# Patient Record
Sex: FEMALE | Race: WHITE | Employment: FULL TIME | URBAN - METROPOLITAN AREA
[De-identification: names, ages, dates, MRNs, and addresses within clinical notes are randomized per-mention and may not be internally consistent; named-entity substitution may affect disease eponyms.]

---

## 2021-02-24 ENCOUNTER — OFFICE VISIT (OUTPATIENT)
Dept: INTERNAL MEDICINE CLINIC | Age: 26
End: 2021-02-24

## 2021-02-24 VITALS
TEMPERATURE: 98.1 F | SYSTOLIC BLOOD PRESSURE: 101 MMHG | BODY MASS INDEX: 24.32 KG/M2 | OXYGEN SATURATION: 98 % | HEIGHT: 60 IN | HEART RATE: 70 BPM | WEIGHT: 123.9 LBS | RESPIRATION RATE: 18 BRPM | DIASTOLIC BLOOD PRESSURE: 65 MMHG

## 2021-02-24 DIAGNOSIS — F41.9 ANXIETY AND DEPRESSION: ICD-10-CM

## 2021-02-24 DIAGNOSIS — E01.0 THYROMEGALY: ICD-10-CM

## 2021-02-24 DIAGNOSIS — Z79.899 PRESCRIPTION MEDICATION STARTED: ICD-10-CM

## 2021-02-24 DIAGNOSIS — Z00.00 WELLNESS EXAMINATION: Primary | ICD-10-CM

## 2021-02-24 DIAGNOSIS — R13.10 DYSPHAGIA, UNSPECIFIED TYPE: ICD-10-CM

## 2021-02-24 DIAGNOSIS — H81.10 BENIGN PAROXYSMAL POSITIONAL VERTIGO, UNSPECIFIED LATERALITY: ICD-10-CM

## 2021-02-24 DIAGNOSIS — T88.7XXA MEDICATION SIDE EFFECT: ICD-10-CM

## 2021-02-24 DIAGNOSIS — F32.A ANXIETY AND DEPRESSION: ICD-10-CM

## 2021-02-24 DIAGNOSIS — Z76.89 ESTABLISHING CARE WITH NEW DOCTOR, ENCOUNTER FOR: ICD-10-CM

## 2021-02-24 LAB
ALBUMIN SERPL-MCNC: 3.9 G/DL (ref 3.5–5)
ALBUMIN/GLOB SERPL: 1.2 {RATIO} (ref 1.1–2.2)
ALP SERPL-CCNC: 84 U/L (ref 45–117)
ALT SERPL-CCNC: 15 U/L (ref 12–78)
ANION GAP SERPL CALC-SCNC: 6 MMOL/L (ref 5–15)
APPEARANCE UR: CLEAR
AST SERPL-CCNC: 15 U/L (ref 15–37)
BASOPHILS # BLD: 0.1 K/UL (ref 0–0.1)
BASOPHILS NFR BLD: 1 % (ref 0–1)
BILIRUB SERPL-MCNC: 0.5 MG/DL (ref 0.2–1)
BILIRUB UR QL: NEGATIVE
BUN SERPL-MCNC: 9 MG/DL (ref 6–20)
BUN/CREAT SERPL: 15 (ref 12–20)
CALCIUM SERPL-MCNC: 9.3 MG/DL (ref 8.5–10.1)
CHLORIDE SERPL-SCNC: 107 MMOL/L (ref 97–108)
CHOLEST SERPL-MCNC: 159 MG/DL
CO2 SERPL-SCNC: 27 MMOL/L (ref 21–32)
COLOR UR: ABNORMAL
CREAT SERPL-MCNC: 0.61 MG/DL (ref 0.55–1.02)
DIFFERENTIAL METHOD BLD: NORMAL
EOSINOPHIL # BLD: 0.2 K/UL (ref 0–0.4)
EOSINOPHIL NFR BLD: 4 % (ref 0–7)
ERYTHROCYTE [DISTWIDTH] IN BLOOD BY AUTOMATED COUNT: 12.3 % (ref 11.5–14.5)
EST. AVERAGE GLUCOSE BLD GHB EST-MCNC: 114 MG/DL
GLOBULIN SER CALC-MCNC: 3.2 G/DL (ref 2–4)
GLUCOSE SERPL-MCNC: 90 MG/DL (ref 65–100)
GLUCOSE UR STRIP.AUTO-MCNC: NEGATIVE MG/DL
HBA1C MFR BLD: 5.6 % (ref 4–5.6)
HCG SERPL QL: NEGATIVE
HCT VFR BLD AUTO: 37.1 % (ref 35–47)
HDLC SERPL-MCNC: 75 MG/DL
HDLC SERPL: 2.1 {RATIO} (ref 0–5)
HGB BLD-MCNC: 11.7 G/DL (ref 11.5–16)
HGB UR QL STRIP: NEGATIVE
IMM GRANULOCYTES # BLD AUTO: 0 K/UL (ref 0–0.04)
IMM GRANULOCYTES NFR BLD AUTO: 0 % (ref 0–0.5)
KETONES UR QL STRIP.AUTO: ABNORMAL MG/DL
LDLC SERPL CALC-MCNC: 73.8 MG/DL (ref 0–100)
LEUKOCYTE ESTERASE UR QL STRIP.AUTO: NEGATIVE
LIPID PROFILE,FLP: NORMAL
LYMPHOCYTES # BLD: 2.4 K/UL (ref 0.8–3.5)
LYMPHOCYTES NFR BLD: 42 % (ref 12–49)
MCH RBC QN AUTO: 29.7 PG (ref 26–34)
MCHC RBC AUTO-ENTMCNC: 31.5 G/DL (ref 30–36.5)
MCV RBC AUTO: 94.2 FL (ref 80–99)
MONOCYTES # BLD: 0.4 K/UL (ref 0–1)
MONOCYTES NFR BLD: 7 % (ref 5–13)
NEUTS SEG # BLD: 2.6 K/UL (ref 1.8–8)
NEUTS SEG NFR BLD: 46 % (ref 32–75)
NITRITE UR QL STRIP.AUTO: NEGATIVE
NRBC # BLD: 0 K/UL (ref 0–0.01)
NRBC BLD-RTO: 0 PER 100 WBC
PH UR STRIP: 5 [PH] (ref 5–8)
PLATELET # BLD AUTO: 287 K/UL (ref 150–400)
PMV BLD AUTO: 10.4 FL (ref 8.9–12.9)
POTASSIUM SERPL-SCNC: 4.3 MMOL/L (ref 3.5–5.1)
PROT SERPL-MCNC: 7.1 G/DL (ref 6.4–8.2)
PROT UR STRIP-MCNC: NEGATIVE MG/DL
RBC # BLD AUTO: 3.94 M/UL (ref 3.8–5.2)
SODIUM SERPL-SCNC: 140 MMOL/L (ref 136–145)
SP GR UR REFRACTOMETRY: 1.02 (ref 1–1.03)
TRIGL SERPL-MCNC: 51 MG/DL (ref ?–150)
TSH SERPL DL<=0.05 MIU/L-ACNC: 1.76 UIU/ML (ref 0.36–3.74)
UROBILINOGEN UR QL STRIP.AUTO: 0.2 EU/DL (ref 0.2–1)
VLDLC SERPL CALC-MCNC: 10.2 MG/DL
WBC # BLD AUTO: 5.6 K/UL (ref 3.6–11)

## 2021-02-24 PROCEDURE — 99213 OFFICE O/P EST LOW 20 MIN: CPT | Performed by: FAMILY MEDICINE

## 2021-02-24 PROCEDURE — 99385 PREV VISIT NEW AGE 18-39: CPT | Performed by: FAMILY MEDICINE

## 2021-02-24 RX ORDER — DESVENLAFAXINE SUCCINATE 50 MG/1
50 TABLET, EXTENDED RELEASE ORAL DAILY
COMMUNITY
Start: 2020-12-17 | End: 2021-03-16 | Stop reason: SDUPTHER

## 2021-02-24 RX ORDER — HYDROXYZINE HYDROCHLORIDE 10 MG/1
10 TABLET, FILM COATED ORAL
Qty: 30 TAB | Refills: 0 | Status: SHIPPED | OUTPATIENT
Start: 2021-02-24 | End: 2021-03-06

## 2021-02-24 NOTE — PROGRESS NOTES
1. Have you been to the ER, urgent care clinic since your last visit? Hospitalized since your last visit? No    2. Have you seen or consulted any other health care providers outside of the [address] [place] [address] since your last visit? Include any pap smears or colon screening.  No

## 2021-02-24 NOTE — PATIENT INSTRUCTIONS
Well Visit, Ages 25 to 48: Care Instructions  Your Care Instructions     Physical exams can help you stay healthy. Your doctor has checked your overall health and may have suggested ways to take good care of yourself. He or she also may have recommended tests. At home, you can help prevent illness with healthy eating, regular exercise, and other steps. Follow-up care is a key part of your treatment and safety. Be sure to make and go to all appointments, and call your doctor if you are having problems. It's also a good idea to know your test results and keep a list of the medicines you take. How can you care for yourself at home? · Reach and stay at a healthy weight. This will lower your risk for many problems, such as obesity, diabetes, heart disease, and high blood pressure. · Get at least 30 minutes of physical activity on most days of the week. Walking is a good choice. You also may want to do other activities, such as running, swimming, cycling, or playing tennis or team sports. Discuss any changes in your exercise program with your doctor. · Do not smoke or allow others to smoke around you. If you need help quitting, talk to your doctor about stop-smoking programs and medicines. These can increase your chances of quitting for good. · Talk to your doctor about whether you have any risk factors for sexually transmitted infections (STIs). Having one sex partner (who does not have STIs and does not have sex with anyone else) is a good way to avoid these infections. · Use birth control if you do not want to have children at this time. Talk with your doctor about the choices available and what might be best for you. · Protect your skin from too much sun. When you're outdoors from 10 a.m. to 4 p.m., stay in the shade or cover up with clothing and a hat with a wide brim. Wear sunglasses that block UV rays. Even when it's cloudy, put broad-spectrum sunscreen (SPF 30 or higher) on any exposed skin.   · See a dentist one or two times a year for checkups and to have your teeth cleaned. · Wear a seat belt in the car. Follow your doctor's advice about when to have certain tests. These tests can spot problems early. For everyone  · Cholesterol. Have the fat (cholesterol) in your blood tested after age 21. Your doctor will tell you how often to have this done based on your age, family history, or other things that can increase your risk for heart disease. · Blood pressure. Have your blood pressure checked during a routine doctor visit. Your doctor will tell you how often to check your blood pressure based on your age, your blood pressure results, and other factors. · Vision. Talk with your doctor about how often to have a glaucoma test.  · Diabetes. Ask your doctor whether you should have tests for diabetes. · Colon cancer. Your risk for colorectal cancer gets higher as you get older. Some experts say that adults should start regular screening at age 48 and stop at age 76. Others say to start before age 48 or continue after age 76. Talk with your doctor about your risk and when to start and stop screening. For women  · Breast exam and mammogram. Talk to your doctor about when you should have a clinical breast exam and a mammogram. Medical experts differ on whether and how often women under 50 should have these tests. Your doctor can help you decide what is right for you. · Cervical cancer screening test and pelvic exam. Begin with a Pap test at age 24. The test often is part of a pelvic exam. Starting at age 27, you may choose to have a Pap test, an HPV test, or both tests at the same time (called co-testing). Talk with your doctor about how often to have testing. · Tests for sexually transmitted infections (STIs). Ask whether you should have tests for STIs. You may be at risk if you have sex with more than one person, especially if your partners do not wear condoms.   For men  · Tests for sexually transmitted infections (STIs). Ask whether you should have tests for STIs. You may be at risk if you have sex with more than one person, especially if you do not wear a condom. · Testicular cancer exam. Ask your doctor whether you should check your testicles regularly. · Prostate exam. Talk to your doctor about whether you should have a blood test (called a PSA test) for prostate cancer. Experts differ on whether and when men should have this test. Some experts suggest it if you are older than 39 and are -American or have a father or brother who got prostate cancer when he was younger than 72. When should you call for help? Watch closely for changes in your health, and be sure to contact your doctor if you have any problems or symptoms that concern you. Where can you learn more? Go to http://www.galan.com/  Enter P072 in the search box to learn more about \"Well Visit, Ages 25 to 48: Care Instructions. \"  Current as of: May 27, 2020               Content Version: 12.6  © 2006-2020 [employer]. Care instructions adapted under license by [employer] (which disclaims liability or warranty for this information). If you have questions about a medical condition or this instruction, always ask your healthcare professional. [address] any warranty or liability for your use of this information. Hydroxyzine (By mouth)   Hydroxyzine Pamoate (sin-AVFI-i-zeen FLORINA-oh-ate)  Treats anxiety, nausea, vomiting, allergies, skin rash, hives, and itching. May also be used with anesthesia for medical procedures. Brand Name(s): Vistaril   There may be other brand names for this medicine. When This Medicine Should Not Be Used: This medicine is not right for everyone. Do not use it if you had an allergic reaction to hydroxyzine, cetirizine, or levocetirizine.  Do not use it during the early part of pregnancy or if you have prolonged QT interval.  How to Use This Medicine:   Capsule, Liquid, Tablet  · Your doctor will tell you how much medicine to use. Do not use more than directed. · Oral liquid: Measure the oral liquid medicine with a marked measuring spoon, oral syringe, or medicine cup. Shake well just before use. · Tablet: Swallow whole. Do not break, crush, or chew it. · Missed dose: Take a dose as soon as you remember. If it is almost time for your next dose, wait until then and take a regular dose. Do not take extra medicine to make up for a missed dose. · Store the medicine in a closed container at room temperature, away from heat, moisture, and direct light. Drugs and Foods to Avoid:   Ask your doctor or pharmacist before using any other medicine, including over-the-counter medicines, vitamins, and herbal products. · Some medicines can affect how hydroxyzine works. Tell your doctor if you are using any of the following:  ¨ Droperidol, methadone, ondansetron, pentamidine  ¨ Antibiotic (including azithromycin, clarithromycin, erythromycin, gatifloxacin, moxifloxacin)  ¨ Medicine to treat depression (including citalopram, fluoxetine)  ¨ Medicine to treat heart rhythm problems (including amiodarone, procainamide, quinidine, sotalol)  ¨ Medicine to treat mental health problems (including chlorpromazine, clozapine, iloperidone, quetiapine, ziprasidone)  · Tell your doctor if you use anything else that makes you sleepy. Some examples are allergy medicine, narcotic pain medicine, and alcohol. Warnings While Using This Medicine:   · Tell your doctor if you are pregnant or breastfeeding, or if you have heart disease, heart failure, a slow heartbeat, skin problems, or had a recent heart attack. · This medicine may cause the following problems:  ¨ Changes in your heart rhythm  ¨ Serious skin reaction called acute generalized exanthematous pustulosis (AGEP)  · This medicine may make you drowsy.  Do not drive or do anything that could be dangerous until you know how this medicine affects you. · Call your doctor if your symptoms do not improve or if they get worse. · Keep all medicine out of the reach of children. Never share your medicine with anyone. Possible Side Effects While Using This Medicine:   Call your doctor right away if you notice any of these side effects:  · Allergic reaction: Itching or hives, swelling in your face or hands, swelling or tingling in your mouth or throat, chest tightness, trouble breathing  · Fainting, dizziness, lightheadedness  · Fast, pounding, or uneven heartbeat  · Fever, skin rash  · Severe tiredness  If you notice these less serious side effects, talk with your doctor:   · Drowsiness, sleepiness  · Dry mouth  If you notice other side effects that you think are caused by this medicine, tell your doctor. Call your doctor for medical advice about side effects. You may report side effects to FDA at 3-020-FDA-3676  © 2017 [facility] Information is for End User's use only and may not be sold, redistributed or otherwise used for commercial purposes. The above information is an  only. It is not intended as medical advice for individual conditions or treatments. Talk to your doctor, nurse or pharmacist before following any medical regimen to see if it is safe and effective for you. Learning About Swallowing Problems  What are swallowing problems? Certain health problems that affect the nervous system can cause trouble swallowing. These conditions include stroke, ALS (also known as Genoveva Gehrig's disease), Parkinson's disease, and multiple sclerosis. The muscles and nerves that help move food through the throat and esophagus may not work right. Growths, such as cancer, and other problems with your esophagus can also make it hard to swallow. The esophagus is the tube that leads from your throat to your stomach. How are swallowing problems diagnosed?   A doctor or speech therapist will examine you to check for swallowing problems. You may get swallowing tests to check how well your throat muscles work. For these tests, you swallow a special liquid that helps the doctor see your throat and esophagus on an X-ray or video screen. Other tests use a thin, flexible tube called a scope to check for problems with your esophagus. The doctor puts the scope in your mouth and down your throat to look at your esophagus. What are the symptoms? Symptoms of swallowing problems may include:  · Trouble getting food or liquids to go down on the first try. · Gagging, choking, or coughing when you swallow. · Having food or liquids come back up through your throat, mouth, or nose after you swallow. · Feeling like foods or liquids are stuck in some part of your throat or chest.  · Pain when you swallow. How are swallowing problems treated? How swallowing problems are treated depends on the cause. The main goals of treatment will be to help you eat and swallow safely and get good nutrition. This is important for your health and quality of life. You may learn exercises to train your throat muscles to work together so you're able to swallow better. Learning certain ways to put food in your mouth or to position your head while eating may also help. Your doctor or a speech therapist may recommend changes to your diet to help make it easier to swallow. You may need to avoid certain foods or liquids. You also may need to change the thickness of foods or liquids in your diet. To eat and swallow safely, follow any instructions you get from your doctor or therapist. These ideas may help:  · Sit upright when eating, drinking, and taking pills. · Take small bites of food. Chew completely and swallow before taking another bite. · Take small sips of liquids. · If eating makes you tired, eat smaller but more frequent meals. · Tip your chin down when there is food in your mouth. Where can you learn more?   Go to http://www.gray.com/  Enter [id number] in the search box to learn more about \"Learning About Swallowing Problems. \"  Current as of: June 26, 2019               Content Version: 12.6  © 6050-6332 [employer]. Care instructions adapted under license by [employer] (which disclaims liability or warranty for this information). If you have questions about a medical condition or this instruction, always ask your healthcare professional. [address] any warranty or liability for your use of this information. Upper GI Series: About This Test  What is it? An upper gastrointestinal (GI) series looks at the upper and middle sections of the gastrointestinal tract. The test uses barium contrast material, fluoroscopy, and X-ray. Fluoroscopy is a kind of X-ray. Why is this test done? An upper GI series is done to find the cause of symptoms such as vomiting, burping up food, trouble swallowing, poor weight gain, bleeding, or belly pain. It's used to find narrow spots or blockages in the upper intestinal tract. The test can also find ulcers, polyps, and pyloric stenosis. How do you prepare for the test?  You may have to eat a low-fiber diet for a few days before the test, stop eating for 12 hours before the test, or both. You may also need to take a laxative to help clean out your intestines the evening before the test and stop taking certain medicines. How is the test done? · You will need to take off your clothes and put on a hospital gown. · Take out any dentures, and take off any jewelry. · You will lie on your back on an X-ray table. · You will have an X-ray taken before you drink the barium mix. Then you'll take small swallows repeatedly during the series of X-rays that follow. · The doctor watches the barium pass through your GI tract using fluoroscopy and X-ray pictures.  The table is tilted at different positions, and you may change positions to help spread the barium. · You may be given a laxative or enema to flush the barium out of your intestines after the test to prevent constipation. How long does the test take? The test will take about 30 to 40 minutes. If you are also having a small bowel study, the test will take 2 to 6 hours. What happens after the test?  · You will probably be able to go home right away. Results of the test are usually ready in 1 to 3 days. · You can go back to your usual activities right away. You may eat and drink whatever you like, unless your doctor tells you not to. It's a good idea to drink a lot of fluids for a few days to flush out the barium. · For 1 to 3 days after the test, your stool (feces) will look white from the barium. · If the barium stays in your intestine, it can harden and cause a blockage. If you get constipated, you may need to use a laxative to pass a stool. · In some cases, you may be asked to come back after 24 hours to have more X-rays taken. When should you call for help? Watch closely for changes in your health, and be sure to contact your doctor if:  · You aren't able to have a bowel movement in 2 to 3 days after the test.  Follow-up care is a key part of your treatment and safety. Be sure to make and go to all appointments, and call your doctor if you are having problems. It's also a good idea to keep a list of the medicines you take. Ask your doctor when you can expect to have your test results. Where can you learn more? Go to http://www.gray.com/  Enter J416 in the search box to learn more about \"Upper GI Series: About This Test.\"  Current as of: December 9, 2019               Content Version: 12.6  © 0453-2854 [employer]. Care instructions adapted under license by [employer] (which disclaims liability or warranty for this information).  If you have questions about a medical condition or this instruction, always ask your healthcare professional. [address] any warranty or liability for your use of this information.

## 2021-02-27 NOTE — PROGRESS NOTES
SPORTS MEDICINE AND PRIMARY CARE  Chata Little MD  [address] [address]    Chief Complaint   Patient presents with    [facility]       SUBJECTIVE:    Brittany Richards is a 31 y.o. female new patient here to [facility]. Past medical history of benign positional vertigo, anxiety and depression managed with Pristiq x2 years and acid reflux. Patient is considering weaning Pristiq because it makes her feel \"lazy. \"  She has a wean designed by her last doctor but has not initiated it. Anxiety levels are intermittently high but patient does not endorse panic disorder. Cognitive behavioral therapy history. Patient has acid reflux, dysphagia for solids and a neck lump sensation. She has anxiety about her gastrointestinal and neck symptoms. Thyroid ultrasound done in 2019 was negative. Current Outpatient Medications   Medication Sig Dispense Refill    desvenlafaxine succinate (PRISTIQ) 50 mg ER tablet Take 50 mg by mouth daily.  hydrOXYzine HCL (ATARAX) 10 mg tablet Take 1 Tab by mouth three (3) times daily as needed for Anxiety for up to 10 days. 30 Tab 0     Past Medical History:   Diagnosis Date    Anxiety     Depression      History reviewed. No pertinent surgical history.   No Known Allergies    REVIEW OF SYSTEMS:  General: negative for - chills or fever  ENT: negative for - headaches, nasal congestion, tinnitus, hearing loss, vision changes, sore throat  Respiratory: negative for - cough, hemoptysis, shortness of breath or wheezing  Cardiovascular : negative for - chest pain, edema, palpitations or shortness of breath  Gastrointestinal: +GERD, dysphagia; negative for - abdominal pain, blood in stools, heartburn or nausea/vomiting, diarrhea, constipation  Genito-Urinary: no dysuria, trouble voiding, hematuria or erectile dysfunction  Musculoskeletal: negative for - gait disturbance, joint pain, joint stiffness , joint swelling, muscle aches  Neurological: no TIA or stroke symptoms  Hematologic: no bruises, no bleeding, no swollen glands  Integument: no lumps, mole changes, nail changes or rash  Endocrine:no malaise/lethargy or unexpected weight changes      Social History     Socioeconomic History    Marital status: SINGLE     Spouse name: Not on file    Number of children: Not on file    Years of education: Not on file    Highest education level: Not on file   Tobacco Use    Smoking status: Never Smoker    Smokeless tobacco: Never Used   Substance and Sexual Activity    Alcohol use: Yes     Frequency: Monthly or less     Drinks per session: 1 or 2     Binge frequency: Less than monthly     Comment: occasional    Drug use: Not Currently    Sexual activity: Yes     Partners: Male     Birth control/protection: Condom     Family History   Problem Relation Age of Onset    Anxiety Mother     Depression Mother     Cancer Father        OBJECTIVE:     Visit Vitals  /65   Pulse 70   Temp 98.1 °F (36.7 °C) (Oral)   Resp 18   Ht 5' (1.524 m)   Wt 123 lb 14.4 oz (56.2 kg)   LMP 02/24/2021   SpO2 98%   BMI 24.20 kg/m²     CONSTITUTIONAL: well developed, well nourished, appears age appropriate  EYES: perrla, eom intact  ENMT:moist mucous membranes, pharynx clear  NECK: supple. Thyroid is smooth and nontender. RESPIRATORY: Chest: clear bilaterally  CARDIOVASCULAR: Heart: regular rate and rhythm pulse 2+  GASTROINTESTINAL: Abdomen: soft, bowel sounds active  HEMATOLOGIC: no pathological lymph nodes palpated  MUSCULOSKELETAL: Extremities: no edema,   INTEGUMENT: No unusual rashes or suspicious skin lesions noted. Nails appear normal.  NEUROLOGIC: non-focal exam   MENTAL STATUS: alert and oriented, appropriate affect         ASSESSMENT:   1. Establishing care with new doctor, encounter for    2. Anxiety and depression    3. Dysphagia, unspecified type    4.  Prescription medication started       I have discussed the diagnosis with the patient and the intended plan as seen in the  orders. The patient understands and agrees with the plan. The patient has   received an after visit summary and questions were answered concerning  future plans  Patient labs and/or xrays were reviewed  Past records were reviewed. PLAN:  .  Orders Placed This Encounter    XR BA SWALLOW ESOPHOGRAM    CBC WITH AUTOMATED DIFF    METABOLIC PANEL, COMPREHENSIVE    HEMOGLOBIN A1C WITH EAG    LIPID PANEL    URINALYSIS W/ RFLX MICROSCOPIC    TSH 3RD GENERATION    HCG QL SERUM     Patient will continue desvenlafaxine succinate (PRISTIQ) 50 mg ER tablet     Initiate hydrOXYzine HCL (ATARAX) 10 mg tablet 3 times daily as needed #30       Follow-up and Dispositions    · Return in about 4 weeks (around 3/24/2021) for virtual visit. Counseled regarding diet, exercise and healthy lifestyle          Advised patient to  call back or return to office if symptoms worsen/change/persist.  Discussed expected course/resolution/complications of diagnosis in detail with patient. Medication risks/benefits/costs/interactions/alternatives discussed with patient    Jazmine Hicks M.D. This note was created using voice recognition software.   Edits have been made but syntax errors might exist.

## 2021-09-14 RX ORDER — DESVENLAFAXINE SUCCINATE 50 MG/1
50 TABLET, EXTENDED RELEASE ORAL DAILY
Qty: 30 TABLET | Refills: 5 | Status: SHIPPED | OUTPATIENT
Start: 2021-09-14 | End: 2021-09-15 | Stop reason: SDUPTHER

## 2021-09-15 RX ORDER — DESVENLAFAXINE SUCCINATE 50 MG/1
50 TABLET, EXTENDED RELEASE ORAL DAILY
Qty: 30 TABLET | Refills: 5 | Status: SHIPPED | OUTPATIENT
Start: 2021-09-15 | End: 2022-09-14 | Stop reason: SDUPTHER

## 2024-03-15 PROBLEM — F41.1 GAD (GENERALIZED ANXIETY DISORDER): Status: ACTIVE | Noted: 2019-09-26

## 2024-03-21 ENCOUNTER — OFFICE VISIT (OUTPATIENT)
Facility: CLINIC | Age: 29
End: 2024-03-21

## 2024-03-21 VITALS
RESPIRATION RATE: 16 BRPM | TEMPERATURE: 97.6 F | BODY MASS INDEX: 24.82 KG/M2 | DIASTOLIC BLOOD PRESSURE: 60 MMHG | OXYGEN SATURATION: 96 % | WEIGHT: 123.13 LBS | HEIGHT: 59 IN | SYSTOLIC BLOOD PRESSURE: 92 MMHG | HEART RATE: 72 BPM

## 2024-03-21 DIAGNOSIS — J45.20 MILD INTERMITTENT ASTHMA WITHOUT COMPLICATION: ICD-10-CM

## 2024-03-21 DIAGNOSIS — Z00.00 WELL WOMAN EXAM (NO GYNECOLOGICAL EXAM): Primary | ICD-10-CM

## 2024-03-21 DIAGNOSIS — F41.1 GAD (GENERALIZED ANXIETY DISORDER): ICD-10-CM

## 2024-03-21 RX ORDER — FLUTICASONE PROPIONATE 110 UG/1
2 AEROSOL, METERED RESPIRATORY (INHALATION) 2 TIMES DAILY
Qty: 12 G | Refills: 2 | Status: SHIPPED | OUTPATIENT
Start: 2024-03-21

## 2024-03-21 SDOH — ECONOMIC STABILITY: FOOD INSECURITY: WITHIN THE PAST 12 MONTHS, THE FOOD YOU BOUGHT JUST DIDN'T LAST AND YOU DIDN'T HAVE MONEY TO GET MORE.: NEVER TRUE

## 2024-03-21 SDOH — ECONOMIC STABILITY: HOUSING INSECURITY
IN THE LAST 12 MONTHS, WAS THERE A TIME WHEN YOU DID NOT HAVE A STEADY PLACE TO SLEEP OR SLEPT IN A SHELTER (INCLUDING NOW)?: NO

## 2024-03-21 SDOH — ECONOMIC STABILITY: FOOD INSECURITY: WITHIN THE PAST 12 MONTHS, YOU WORRIED THAT YOUR FOOD WOULD RUN OUT BEFORE YOU GOT MONEY TO BUY MORE.: NEVER TRUE

## 2024-03-21 SDOH — ECONOMIC STABILITY: INCOME INSECURITY: HOW HARD IS IT FOR YOU TO PAY FOR THE VERY BASICS LIKE FOOD, HOUSING, MEDICAL CARE, AND HEATING?: NOT HARD AT ALL

## 2024-03-21 ASSESSMENT — ANXIETY QUESTIONNAIRES
IF YOU CHECKED OFF ANY PROBLEMS ON THIS QUESTIONNAIRE, HOW DIFFICULT HAVE THESE PROBLEMS MADE IT FOR YOU TO DO YOUR WORK, TAKE CARE OF THINGS AT HOME, OR GET ALONG WITH OTHER PEOPLE: SOMEWHAT DIFFICULT
1. FEELING NERVOUS, ANXIOUS, OR ON EDGE: MORE THAN HALF THE DAYS
4. TROUBLE RELAXING: MORE THAN HALF THE DAYS
3. WORRYING TOO MUCH ABOUT DIFFERENT THINGS: MORE THAN HALF THE DAYS
2. NOT BEING ABLE TO STOP OR CONTROL WORRYING: MORE THAN HALF THE DAYS
5. BEING SO RESTLESS THAT IT IS HARD TO SIT STILL: MORE THAN HALF THE DAYS
6. BECOMING EASILY ANNOYED OR IRRITABLE: MORE THAN HALF THE DAYS
GAD7 TOTAL SCORE: 14
7. FEELING AFRAID AS IF SOMETHING AWFUL MIGHT HAPPEN: MORE THAN HALF THE DAYS

## 2024-03-21 ASSESSMENT — PATIENT HEALTH QUESTIONNAIRE - PHQ9
9. THOUGHTS THAT YOU WOULD BE BETTER OFF DEAD, OR OF HURTING YOURSELF: NOT AT ALL
10. IF YOU CHECKED OFF ANY PROBLEMS, HOW DIFFICULT HAVE THESE PROBLEMS MADE IT FOR YOU TO DO YOUR WORK, TAKE CARE OF THINGS AT HOME, OR GET ALONG WITH OTHER PEOPLE: SOMEWHAT DIFFICULT
SUM OF ALL RESPONSES TO PHQ QUESTIONS 1-9: 6
5. POOR APPETITE OR OVEREATING: SEVERAL DAYS
4. FEELING TIRED OR HAVING LITTLE ENERGY: SEVERAL DAYS
2. FEELING DOWN, DEPRESSED OR HOPELESS: SEVERAL DAYS
8. MOVING OR SPEAKING SO SLOWLY THAT OTHER PEOPLE COULD HAVE NOTICED. OR THE OPPOSITE, BEING SO FIGETY OR RESTLESS THAT YOU HAVE BEEN MOVING AROUND A LOT MORE THAN USUAL: NOT AT ALL
7. TROUBLE CONCENTRATING ON THINGS, SUCH AS READING THE NEWSPAPER OR WATCHING TELEVISION: SEVERAL DAYS
SUM OF ALL RESPONSES TO PHQ QUESTIONS 1-9: 6
6. FEELING BAD ABOUT YOURSELF - OR THAT YOU ARE A FAILURE OR HAVE LET YOURSELF OR YOUR FAMILY DOWN: NOT AT ALL
SUM OF ALL RESPONSES TO PHQ9 QUESTIONS 1 & 2: 2
1. LITTLE INTEREST OR PLEASURE IN DOING THINGS: SEVERAL DAYS
3. TROUBLE FALLING OR STAYING ASLEEP: SEVERAL DAYS

## 2024-03-21 NOTE — PROGRESS NOTES
\"Have you been to the ER, urgent care clinic since your last visit?  Hospitalized since your last visit?\"    YES - When: approximately 1  weeks ago.  Where and Why: [place], anxiety attack.    “Have you seen or consulted any other health care providers outside of [facility] since your last visit?”    NO    Chief Complaint   Patient presents with    Establish Care     Anxiety, pt states she had just got off her anxiety medication but lately she had been having some issues with her anxiety.                 Click Here for Release of Records Request

## 2024-03-21 NOTE — PROGRESS NOTES
Anastasiia Lee (: 1995) is a 28 y.o. female, new patient, here for evaluation of the following chief complaint(s):  Establish Care (Anxiety, pt states she had just got off her anxiety medication but lately she had been having some issues with her anxiety.)       Assessment/Plan:  1. Well woman exam (no gynecological exam)  Discussed age appropriate well woman screenings and preventative care.  Tdap booster today due to upcoming close contact with .  Recommend starting prenatal vitamin daily due to current sexual activity without contraception.  Overdue for Pap smear, recommend returning for this or establishing with OB/GYN  - Tdap, BOOSTRIX, (age 10 yrs+), IM    2. Mild intermittent asthma without complication  Currently without exacerbation.  With opening spring season, continue Flovent.  Albuterol as needed.  Discussed avoiding triggers.  Can also use albuterol before exercise, ideally 30 minutes before starting.  - fluticasone (FLOVENT HFA) 110 MCG/ACT inhaler; Inhale 2 puffs into the lungs 2 times daily  Dispense: 12 g; Refill: 2    3. DEVIN (generalized anxiety disorder)  Recent exacerbation of chronic condition.  Patient prefers to avoid the side effects of daily antianxiety medications.  Strongly recommend CBT psychotherapy.  Recommend reaching out to insurance for network providers, virtual may be an option due to time limitations.  Would still recommend picking up the hydroxyzine prescribed by urgent care for as needed use.      Return in about 1 year (around 3/21/2025) for routine check up.     Subjective/Objective:  HPI  Establish Care-   - Moved here 3 months ago.   - Works as [employer]  in [place].  Boyfriend is helping set up [facility], and then probably the [place] [address] as well      Mental Health  Anxiety  - Went to urgent care on Monday. Panic type symptoms. Lasted longer than prior episodes. Almost presyncopal symptoms.   - Last therapy for 1.5 months last

## 2024-04-15 ENCOUNTER — NURSE ONLY (OUTPATIENT)
Facility: CLINIC | Age: 29
End: 2024-04-15
Payer: COMMERCIAL

## 2024-04-15 ENCOUNTER — TELEPHONE (OUTPATIENT)
Facility: CLINIC | Age: 29
End: 2024-04-15

## 2024-04-15 ENCOUNTER — HOSPITAL ENCOUNTER (OUTPATIENT)
Facility: HOSPITAL | Age: 29
Setting detail: SPECIMEN
Discharge: HOME | End: 2024-04-18

## 2024-04-15 DIAGNOSIS — Z00.00 WELL WOMAN EXAM (NO GYNECOLOGICAL EXAM): ICD-10-CM

## 2024-04-15 DIAGNOSIS — Z00.00 WELL WOMAN EXAM (NO GYNECOLOGICAL EXAM): Primary | ICD-10-CM

## 2024-04-15 LAB — SENTARA SPECIMEN COLLECTION: NORMAL

## 2024-04-15 PROCEDURE — 99001 SPECIMEN HANDLING PT-LAB: CPT

## 2024-04-15 PROCEDURE — 36415 COLL VENOUS BLD VENIPUNCTURE: CPT | Performed by: STUDENT IN AN ORGANIZED HEALTH CARE EDUCATION/TRAINING PROGRAM

## 2024-04-15 NOTE — PROGRESS NOTES
Patient here for NV lab draw name and  verified venipuncture performed on patients left arm was successful patient tolerated well.

## 2024-04-15 NOTE — TELEPHONE ENCOUNTER
Patient called the office this morning stating at her visit she declined to have labs done but has since changed her mind. Told pt I will send a message to Dr. Encarnacion to place lab orders and will call her to schedule NV once the orders have been placed.

## 2024-04-17 LAB
A/G RATIO: 1.7 RATIO (ref 1.1–2.6)
ALBUMIN: 5 G/DL (ref 3.5–5)
ALP BLD-CCNC: 60 U/L (ref 25–115)
ALT SERPL-CCNC: 12 U/L (ref 5–40)
ANION GAP SERPL CALCULATED.3IONS-SCNC: 13 MMOL/L (ref 3–15)
AST SERPL-CCNC: 18 U/L (ref 10–37)
BILIRUB SERPL-MCNC: 0.4 MG/DL (ref 0.2–1.2)
BUN BLDV-MCNC: 10 MG/DL (ref 6–22)
CALCIUM SERPL-MCNC: 10 MG/DL (ref 8.4–10.5)
CHLORIDE BLD-SCNC: 104 MMOL/L (ref 98–110)
CHOLESTEROL/HDL RATIO: 2.6 (ref 0–5)
CHOLESTEROL: 191 MG/DL (ref 110–200)
CO2: 24 MMOL/L (ref 20–32)
CREAT SERPL-MCNC: 0.5 MG/DL (ref 0.5–1.2)
GLOBULIN: 2.9 G/DL (ref 2–4)
GLOMERULAR FILTRATION RATE: >60 ML/MIN/1.73 SQ.M.
GLUCOSE: 94 MG/DL (ref 70–99)
HCT VFR BLD CALC: 40.2 % (ref 35.1–46.5)
HDLC SERPL-MCNC: 74 MG/DL
HEMOGLOBIN: 13.4 G/DL (ref 11.7–15.5)
LDL CHOLESTEROL CALCULATED: 108 MG/DL (ref 50–99)
LDL/HDL RATIO: 1.5
MCH RBC QN AUTO: 30 PG (ref 26–34)
MCHC RBC AUTO-ENTMCNC: 33 G/DL (ref 31–36)
MCV RBC AUTO: 90 FL (ref 80–99)
NON-HDL CHOLESTEROL: 117 MG/DL
PDW BLD-RTO: 12.1 % (ref 10–15.5)
PLATELET # BLD: 305 K/UL (ref 140–440)
PMV BLD AUTO: 12.3 FL (ref 9–13)
POTASSIUM SERPL-SCNC: 5.5 MMOL/L (ref 3.5–5.5)
RBC: 4.45 M/UL (ref 3.8–5.2)
SODIUM BLD-SCNC: 141 MMOL/L (ref 133–145)
T4 FREE: 1.4 NG/DL (ref 0.9–1.8)
TOTAL PROTEIN: 7.9 G/DL (ref 6.4–8.3)
TRIGL SERPL-MCNC: 45 MG/DL (ref 40–149)
TSH SERPL DL<=0.05 MIU/L-ACNC: 1.12 MCU/ML (ref 0.27–4.2)
VLDLC SERPL CALC-MCNC: 9 MG/DL (ref 8–30)
WBC: 5.7 K/UL (ref 4–11)

## 2024-05-09 ENCOUNTER — OFFICE VISIT (OUTPATIENT)
Facility: CLINIC | Age: 29
End: 2024-05-09
Payer: COMMERCIAL

## 2024-05-09 VITALS
HEART RATE: 71 BPM | SYSTOLIC BLOOD PRESSURE: 99 MMHG | WEIGHT: 116 LBS | HEIGHT: 59 IN | OXYGEN SATURATION: 100 % | BODY MASS INDEX: 23.39 KG/M2 | RESPIRATION RATE: 16 BRPM | DIASTOLIC BLOOD PRESSURE: 59 MMHG | TEMPERATURE: 98.4 F

## 2024-05-09 DIAGNOSIS — F41.9 ANXIETY: Primary | ICD-10-CM

## 2024-05-09 PROCEDURE — 99214 OFFICE O/P EST MOD 30 MIN: CPT | Performed by: FAMILY MEDICINE

## 2024-05-09 RX ORDER — SERTRALINE HYDROCHLORIDE 25 MG/1
25 TABLET, FILM COATED ORAL DAILY
Qty: 30 TABLET | Refills: 0 | Status: SHIPPED | OUTPATIENT
Start: 2024-05-09

## 2024-05-09 SDOH — ECONOMIC STABILITY: FOOD INSECURITY: WITHIN THE PAST 12 MONTHS, THE FOOD YOU BOUGHT JUST DIDN'T LAST AND YOU DIDN'T HAVE MONEY TO GET MORE.: NEVER TRUE

## 2024-05-09 SDOH — ECONOMIC STABILITY: INCOME INSECURITY: HOW HARD IS IT FOR YOU TO PAY FOR THE VERY BASICS LIKE FOOD, HOUSING, MEDICAL CARE, AND HEATING?: NOT HARD AT ALL

## 2024-05-09 SDOH — ECONOMIC STABILITY: FOOD INSECURITY: WITHIN THE PAST 12 MONTHS, YOU WORRIED THAT YOUR FOOD WOULD RUN OUT BEFORE YOU GOT MONEY TO BUY MORE.: NEVER TRUE

## 2024-05-09 SDOH — HEALTH STABILITY: PHYSICAL HEALTH: ON AVERAGE, HOW MANY MINUTES DO YOU ENGAGE IN EXERCISE AT THIS LEVEL?: 20 MIN

## 2024-05-09 SDOH — HEALTH STABILITY: PHYSICAL HEALTH: ON AVERAGE, HOW MANY DAYS PER WEEK DO YOU ENGAGE IN MODERATE TO STRENUOUS EXERCISE (LIKE A BRISK WALK)?: 2 DAYS

## 2024-05-09 ASSESSMENT — PATIENT HEALTH QUESTIONNAIRE - PHQ9
2. FEELING DOWN, DEPRESSED OR HOPELESS: NOT AT ALL
SUM OF ALL RESPONSES TO PHQ9 QUESTIONS 1 & 2: 0
SUM OF ALL RESPONSES TO PHQ QUESTIONS 1-9: 0
1. LITTLE INTEREST OR PLEASURE IN DOING THINGS: NOT AT ALL

## 2024-05-09 NOTE — PROGRESS NOTES
\"Have you been to the ER, urgent care clinic since your last visit?  Hospitalized since your last visit?\"    NO    “Have you seen or consulted any other health care providers outside of [facility] since your last visit?”    NO            Click Here for Release of Records Request    
Affect: Mood is anxious and depressed. Affect is tearful.         Behavior: Behavior normal.         Thought Content: Thought content normal.         Judgment: Judgment normal.        Assessment/Plan:    #1 anxiety  Follow-up with therapist or counselor.  Trial of low-dose Zoloft.  Recommend healthy eating and exercise.  Recommend stress reduction, meditation, yoga.  Trial of calm or Insight timer apps.  Continue magnesium.      Dorys Field MDThis office note has been dictated.

## 2024-05-13 ENCOUNTER — OFFICE VISIT (OUTPATIENT)
Facility: CLINIC | Age: 29
End: 2024-05-13
Payer: COMMERCIAL

## 2024-05-13 VITALS
HEIGHT: 59 IN | RESPIRATION RATE: 19 BRPM | DIASTOLIC BLOOD PRESSURE: 70 MMHG | TEMPERATURE: 98.1 F | WEIGHT: 112 LBS | HEART RATE: 86 BPM | OXYGEN SATURATION: 100 % | BODY MASS INDEX: 22.58 KG/M2 | SYSTOLIC BLOOD PRESSURE: 104 MMHG

## 2024-05-13 DIAGNOSIS — Z76.89 ENCOUNTER TO ESTABLISH CARE: ICD-10-CM

## 2024-05-13 DIAGNOSIS — M79.10 MYALGIA: ICD-10-CM

## 2024-05-13 DIAGNOSIS — F41.1 GAD (GENERALIZED ANXIETY DISORDER): Primary | ICD-10-CM

## 2024-05-13 DIAGNOSIS — F33.0 MILD EPISODE OF RECURRENT MAJOR DEPRESSIVE DISORDER (HCC): ICD-10-CM

## 2024-05-13 PROCEDURE — 99214 OFFICE O/P EST MOD 30 MIN: CPT

## 2024-05-13 RX ORDER — CYCLOBENZAPRINE HCL 10 MG
10 TABLET ORAL 3 TIMES DAILY PRN
Qty: 21 TABLET | Refills: 0 | Status: SHIPPED | OUTPATIENT
Start: 2024-05-13 | End: 2024-05-23

## 2024-05-13 RX ORDER — DULOXETIN HYDROCHLORIDE 20 MG/1
20 CAPSULE, DELAYED RELEASE ORAL DAILY
Qty: 30 CAPSULE | Refills: 3 | Status: SHIPPED | OUTPATIENT
Start: 2024-05-13

## 2024-05-13 SDOH — ECONOMIC STABILITY: FOOD INSECURITY: WITHIN THE PAST 12 MONTHS, THE FOOD YOU BOUGHT JUST DIDN'T LAST AND YOU DIDN'T HAVE MONEY TO GET MORE.: NEVER TRUE

## 2024-05-13 SDOH — ECONOMIC STABILITY: FOOD INSECURITY: WITHIN THE PAST 12 MONTHS, YOU WORRIED THAT YOUR FOOD WOULD RUN OUT BEFORE YOU GOT MONEY TO BUY MORE.: NEVER TRUE

## 2024-05-13 SDOH — ECONOMIC STABILITY: INCOME INSECURITY: HOW HARD IS IT FOR YOU TO PAY FOR THE VERY BASICS LIKE FOOD, HOUSING, MEDICAL CARE, AND HEATING?: NOT HARD AT ALL

## 2024-05-13 ASSESSMENT — PATIENT HEALTH QUESTIONNAIRE - PHQ9
1. LITTLE INTEREST OR PLEASURE IN DOING THINGS: SEVERAL DAYS
SUM OF ALL RESPONSES TO PHQ QUESTIONS 1-9: 10
7. TROUBLE CONCENTRATING ON THINGS, SUCH AS READING THE NEWSPAPER OR WATCHING TELEVISION: SEVERAL DAYS
6. FEELING BAD ABOUT YOURSELF - OR THAT YOU ARE A FAILURE OR HAVE LET YOURSELF OR YOUR FAMILY DOWN: NOT AT ALL
4. FEELING TIRED OR HAVING LITTLE ENERGY: MORE THAN HALF THE DAYS
8. MOVING OR SPEAKING SO SLOWLY THAT OTHER PEOPLE COULD HAVE NOTICED. OR THE OPPOSITE, BEING SO FIGETY OR RESTLESS THAT YOU HAVE BEEN MOVING AROUND A LOT MORE THAN USUAL: MORE THAN HALF THE DAYS
SUM OF ALL RESPONSES TO PHQ QUESTIONS 1-9: 10
5. POOR APPETITE OR OVEREATING: SEVERAL DAYS
2. FEELING DOWN, DEPRESSED OR HOPELESS: SEVERAL DAYS
SUM OF ALL RESPONSES TO PHQ9 QUESTIONS 1 & 2: 2
3. TROUBLE FALLING OR STAYING ASLEEP: MORE THAN HALF THE DAYS
9. THOUGHTS THAT YOU WOULD BE BETTER OFF DEAD, OR OF HURTING YOURSELF: NOT AT ALL

## 2024-05-13 ASSESSMENT — ANXIETY QUESTIONNAIRES
7. FEELING AFRAID AS IF SOMETHING AWFUL MIGHT HAPPEN: MORE THAN HALF THE DAYS
4. TROUBLE RELAXING: SEVERAL DAYS
1. FEELING NERVOUS, ANXIOUS, OR ON EDGE: NEARLY EVERY DAY
2. NOT BEING ABLE TO STOP OR CONTROL WORRYING: MORE THAN HALF THE DAYS
6. BECOMING EASILY ANNOYED OR IRRITABLE: SEVERAL DAYS
5. BEING SO RESTLESS THAT IT IS HARD TO SIT STILL: SEVERAL DAYS
3. WORRYING TOO MUCH ABOUT DIFFERENT THINGS: MORE THAN HALF THE DAYS
GAD7 TOTAL SCORE: 12

## 2024-05-13 ASSESSMENT — ENCOUNTER SYMPTOMS: SHORTNESS OF BREATH: 0

## 2024-05-13 NOTE — PROGRESS NOTES
able to stop or control worrying More than half the days More than half the days   Worrying too much about different things More than half the days More than half the days   Trouble relaxing Several days More than half the days   Being so restless that it is hard to sit still Several days More than half the days   Becoming easily annoyed or irritable Several days More than half the days   Feeling afraid as if something awful might happen More than half the days More than half the days   DEVIN-7 Total Score 12 14   How difficult have these problems made it for you to do your work, take care of things at home, or get along with other people?  Somewhat difficult          Learning Assessment:  No question data found.     Fall Risk       No data to display                   Travel Screening:    Travel Screening     No screening recorded since 05/12/24 0000       Travel History   Travel since 04/13/24    No documented travel since 04/13/24            Health Maintenance reviewed and discussed and ordered per Provider.  Transportation Needs: Unknown (5/13/2024)    PRAPARE - Transportation     Lack of Transportation (Medical): Not on file     Lack of Transportation (Non-Medical): No      Food Insecurity: No Food Insecurity (5/13/2024)    Hunger Vital Sign     Worried About Running Out of Food in the Last Year: Never true     Ran Out of Food in the Last Year: Never true     Financial Resource Strain: Low Risk  (5/13/2024)    Overall Financial Resource Strain (CARDIA)     Difficulty of Paying Living Expenses: Not hard at all     Housing Stability: Unknown (5/13/2024)    Housing Stability Vital Sign     Unable to Pay for Housing in the Last Year: Not on file     Number of Places Lived in the Last Year: Not on file     Unstable Housing in the Last Year: No       Did you provide resources if patient requested them? no      Health Maintenance Due   Topic Date Due    Hepatitis B vaccine (1 of 3 - 3-dose series) Never done    COVID-19 
     Behavior: Behavior is cooperative.         Thought Content: Thought content normal.         Judgment: Judgment normal.         MICHAEL DillonC

## 2024-12-20 NOTE — ASSESSMENT & PLAN NOTE
"Incoming call from patient. OB patient 14w0d  having burning with intercourse, vaginal irritation/redness and an increase in vaginal discharge for the past week. Denies itching, odor. Appointment scheduled for evaluation today, .    Reason for Disposition   Patient wants to be seen    Answer Assessment - Initial Assessment Questions  1. SYMPTOM: \"What's the main symptom you're concerned about?\" (e.g., pain, itching, dryness)      Burning with intercourse, increased vaginal discharge, vaginal area is red and irritated.   2. LOCATION: \"Where is the  symptoms located?\" (e.g., inside/outside, left/right)      vagina  3. ONSET: \"When did the  symptoms  start?\"      1 week ago  4. PAIN: \"Is there any pain?\" If Yes, ask: \"How bad is it?\" (Scale: 1-10; mild, moderate, severe)      Irritation and burning  5. ITCHING: \"Is there any itching?\" If Yes, ask: \"How bad is it?\" (Scale: 1-10; mild, moderate, severe)      denies  6. CAUSE: \"What do you think is causing the discharge?\" \"Have you had the same problem before?\" \"What happened then?\"      unknown  7. OTHER SYMPTOMS: \"Do you have any other symptoms?\" (e.g., fever, itching, vaginal bleeding, pain with urination, injury to genital area, vaginal foreign body)      denies  8. PREGNANCY: \"Is there any chance you are pregnant?\" \"When was your last menstrual period?\"      15w0d    Protocols used: Vaginal Symptoms-Adult-[place]    " Discontinue zoloft d/t worsened anxiety, diarrhea  Trial duloxetine 20 mg daily  Referral to psychiatry for management